# Patient Record
(demographics unavailable — no encounter records)

---

## 2025-03-14 NOTE — ASSESSMENT
[FreeTextEntry1] : Brain fog - CIBO resolved Obtain med records for review Rx - Fluconazole Probiotic Losartan 100 mg daily Inc po fluids Labs for Angioedema - ess neg Elevated IGM - 382, Dec CD4 - HIV neg, lab studies for atopy all neg. All questions answered Re check 1 month

## 2025-03-14 NOTE — PHYSICAL EXAM
[No Acute Distress] : no acute distress [Normal Sclera/Conjunctiva] : normal sclera/conjunctiva [EOMI] : extraocular movements intact [Normal Outer Ear/Nose] : the outer ears and nose were normal in appearance [Normal TMs] : both tympanic membranes were normal [No JVD] : no jugular venous distention [No Lymphadenopathy] : no lymphadenopathy [No Respiratory Distress] : no respiratory distress  [Clear to Auscultation] : lungs were clear to auscultation bilaterally [Regular Rhythm] : with a regular rhythm [No Edema] : there was no peripheral edema [Soft] : abdomen soft [Non Tender] : non-tender [Normal Bowel Sounds] : normal bowel sounds [No CVA Tenderness] : no CVA  tenderness [No Joint Swelling] : no joint swelling [No Rash] : no rash [Normal Gait] : normal gait [de-identified] : Oral candida / thrush

## 2025-03-14 NOTE — REVIEW OF SYSTEMS
[Headache] : headache [Fever] : no fever [Chills] : no chills [Fatigue] : no fatigue [Discharge] : no discharge [Pain] : no pain [Itching] : no itching [Earache] : no earache [Hearing Loss] : no hearing loss [Sore Throat] : no sore throat [Hoarseness] : no hoarseness [Chest Pain] : no chest pain [Palpitations] : no palpitations [Lower Ext Edema] : no lower extremity edema [Shortness Of Breath] : no shortness of breath [Wheezing] : no wheezing [Cough] : no cough [Abdominal Pain] : no abdominal pain [Nausea] : no nausea [Constipation] : no constipation [Vomiting] : no vomiting [Dysuria] : no dysuria [Incontinence] : no incontinence [Frequency] : no frequency [Joint Pain] : no joint pain [Itching] : no itching [Skin Rash] : no skin rash [Dizziness] : no dizziness [FreeTextEntry4] : Ultra sens sense of smell tinnitis [de-identified] : Migraines brain MRI normal

## 2025-03-14 NOTE — HISTORY OF PRESENT ILLNESS
[FreeTextEntry1] : Initial visit - 1 year ago in [place] and while on plane had food poisoning no gas or diarrhea - had BM w/ + response  Then next few days + Abd bloating after Protein shake - then noted brain Fog Had change w/ bowel movement - Saw GI and recc Zyfaxin and Neomycin w/ + response felt CIBO and recc Flagyl - x 2 weeks and recurred. PCP Dr Razo. Saw another GI and again improved. Then Flagyl x 10 days w/o response. Then notes rash Rt inguinal region.  July 2024 developed oral thrush and constipation saw oral surg and recc Fluconazole w/ +/- relief. Still has remnant of rash on lower ext and fatigue." lost 50 lbs in 2 months". Now gained back 10 lbs saw oncology and Dx systemic candidiasis - On Fluconazole 300 mg daily and Probiotic ? dose. Now bowels normal - recent eye swelling and lip w/ pain in oct 2024 and Tinnitus bilat/ Fatigue. Now new issue swelling hands ankle and feet.  Feels sick again and wants eval - sx vague and persistent [de-identified] : CIBO - resolved Systemic Candidiasis [TextEntry] : Pmhx - RLE cellulitis Rt groin swelling +/- Hernia/Lymph node Psurghx - Ing hernia  Had COVID 2 months before CIBO Famhx - Mother 75 - dehydration Father 75 S/p CVA and CAD Sochx - non smoker Smoked 10 years 1 ppd quit 5 years ago, No etoh, Occup consul - Phtography All - ? contrast dye (resp issues and hives) ni allergy